# Patient Record
Sex: FEMALE | Race: WHITE | NOT HISPANIC OR LATINO | Employment: FULL TIME | URBAN - METROPOLITAN AREA
[De-identification: names, ages, dates, MRNs, and addresses within clinical notes are randomized per-mention and may not be internally consistent; named-entity substitution may affect disease eponyms.]

---

## 2018-05-22 ENCOUNTER — HISTORICAL (OUTPATIENT)
Dept: ADMINISTRATIVE | Facility: HOSPITAL | Age: 38
End: 2018-05-22

## 2018-05-24 LAB — FINAL CULTURE: NORMAL

## 2018-08-14 ENCOUNTER — HISTORICAL (OUTPATIENT)
Dept: ADMINISTRATIVE | Facility: HOSPITAL | Age: 38
End: 2018-08-14

## 2018-08-16 LAB — FINAL CULTURE: NORMAL

## 2018-12-18 ENCOUNTER — HISTORICAL (OUTPATIENT)
Dept: ADMINISTRATIVE | Facility: HOSPITAL | Age: 38
End: 2018-12-18

## 2018-12-20 LAB — FINAL CULTURE: NORMAL

## 2019-04-22 ENCOUNTER — HISTORICAL (OUTPATIENT)
Dept: ADMINISTRATIVE | Facility: HOSPITAL | Age: 39
End: 2019-04-22

## 2019-04-22 LAB
APPEARANCE, UA: CLEAR
BACTERIA #/AREA URNS AUTO: ABNORMAL /[HPF]
BILIRUB UR QL STRIP: NEGATIVE
COLOR UR: ABNORMAL
GLUCOSE (UA): NORMAL
HGB UR QL STRIP: 0.03 MG/DL
HYALINE CASTS #/AREA URNS LPF: ABNORMAL /[LPF]
KETONES UR QL STRIP: NEGATIVE
LEUKOCYTE ESTERASE UR QL STRIP: NEGATIVE
NITRITE UR QL STRIP: NEGATIVE
PH UR STRIP: 6 [PH] (ref 4.5–8)
PROT UR QL STRIP: NEGATIVE
RBC #/AREA URNS AUTO: ABNORMAL /[HPF]
SP GR UR STRIP: 1.01 (ref 1–1.03)
SQUAMOUS #/AREA URNS LPF: ABNORMAL /[LPF]
UROBILINOGEN UR STRIP-ACNC: NORMAL
WBC #/AREA URNS AUTO: ABNORMAL /HPF

## 2019-08-09 ENCOUNTER — HISTORICAL (OUTPATIENT)
Dept: WOUND CARE | Facility: HOSPITAL | Age: 39
End: 2019-08-09

## 2019-08-09 LAB
ABS NEUT (OLG): 4.62 X10(3)/MCL (ref 2.1–9.2)
BASOPHILS # BLD AUTO: 0.05 X10(3)/MCL
BASOPHILS NFR BLD AUTO: 1 %
EOSINOPHIL # BLD AUTO: 0.11 10*3/UL
EOSINOPHIL NFR BLD AUTO: 1 %
ERYTHROCYTE [DISTWIDTH] IN BLOOD BY AUTOMATED COUNT: 13.7 % (ref 11.5–14.5)
HCT VFR BLD AUTO: 37.4 % (ref 35–46)
HGB BLD-MCNC: 12.5 GM/DL (ref 12–16)
IMM GRANULOCYTES # BLD AUTO: 0.01 10*3/UL
IMM GRANULOCYTES NFR BLD AUTO: 0 %
LYMPHOCYTES # BLD AUTO: 2.49 X10(3)/MCL
LYMPHOCYTES NFR BLD AUTO: 32 % (ref 13–40)
MCH RBC QN AUTO: 31.5 PG (ref 26–34)
MCHC RBC AUTO-ENTMCNC: 33.4 GM/DL (ref 31–37)
MCV RBC AUTO: 94.2 FL (ref 80–100)
MONOCYTES # BLD AUTO: 0.58 X10(3)/MCL
MONOCYTES NFR BLD AUTO: 7 % (ref 0–24)
NEUTROPHILS # BLD AUTO: 4.62 X10(3)/MCL
NEUTROPHILS NFR BLD AUTO: 59 X10(3)/MCL
PLATELET # BLD AUTO: 335 X10(3)/MCL (ref 130–400)
PMV BLD AUTO: 9.7 FL (ref 7.4–10.4)
RBC # BLD AUTO: 3.97 X10(6)/MCL (ref 4–5.2)
WBC # SPEC AUTO: 7.9 X10(3)/MCL (ref 4.5–11)

## 2019-09-30 ENCOUNTER — HISTORICAL (OUTPATIENT)
Dept: CARDIOLOGY | Facility: HOSPITAL | Age: 39
End: 2019-09-30

## 2019-09-30 LAB
ABS NEUT (OLG): 4.47 X10(3)/MCL (ref 2.1–9.2)
BASOPHILS # BLD AUTO: 0 X10(3)/MCL (ref 0–0.2)
BASOPHILS NFR BLD AUTO: 1 %
BUN SERPL-MCNC: 20 MG/DL (ref 7–18)
CALCIUM SERPL-MCNC: 8.6 MG/DL (ref 8.5–10.1)
CHLORIDE SERPL-SCNC: 110 MMOL/L (ref 98–107)
CO2 SERPL-SCNC: 23 MMOL/L (ref 21–32)
CREAT SERPL-MCNC: 0.83 MG/DL (ref 0.55–1.02)
CREAT/UREA NIT SERPL: 24.1
EOSINOPHIL # BLD AUTO: 0.1 X10(3)/MCL (ref 0–0.9)
EOSINOPHIL NFR BLD AUTO: 2 %
ERYTHROCYTE [DISTWIDTH] IN BLOOD BY AUTOMATED COUNT: 13.4 % (ref 11.5–17)
GLUCOSE SERPL-MCNC: 82 MG/DL (ref 74–106)
HCT VFR BLD AUTO: 37.9 % (ref 37–47)
HGB BLD-MCNC: 12.6 GM/DL (ref 12–16)
INR PPP: 1.1 (ref 0–1.3)
LYMPHOCYTES # BLD AUTO: 1.9 X10(3)/MCL (ref 0.6–4.6)
LYMPHOCYTES NFR BLD AUTO: 27 %
MCH RBC QN AUTO: 31.3 PG (ref 27–31)
MCHC RBC AUTO-ENTMCNC: 33.2 GM/DL (ref 33–36)
MCV RBC AUTO: 94 FL (ref 80–94)
MONOCYTES # BLD AUTO: 0.6 X10(3)/MCL (ref 0.1–1.3)
MONOCYTES NFR BLD AUTO: 8 %
NEUTROPHILS # BLD AUTO: 4.47 X10(3)/MCL (ref 2.1–9.2)
NEUTROPHILS NFR BLD AUTO: 62 %
PLATELET # BLD AUTO: 251 X10(3)/MCL (ref 130–400)
PMV BLD AUTO: 9.4 FL (ref 9.4–12.4)
POTASSIUM SERPL-SCNC: 3.8 MMOL/L (ref 3.5–5.1)
PROTHROMBIN TIME: 13.9 SECOND(S) (ref 12–14)
RBC # BLD AUTO: 4.03 X10(6)/MCL (ref 4.2–5.4)
SODIUM SERPL-SCNC: 142 MMOL/L (ref 136–145)
WBC # SPEC AUTO: 7.2 X10(3)/MCL (ref 4.5–11.5)

## 2020-02-12 ENCOUNTER — HISTORICAL (OUTPATIENT)
Dept: ADMINISTRATIVE | Facility: HOSPITAL | Age: 40
End: 2020-02-12

## 2020-02-12 LAB
APPEARANCE, UA: CLEAR
BACTERIA #/AREA URNS AUTO: ABNORMAL /HPF
BILIRUB UR QL STRIP: NEGATIVE
COLOR UR: YELLOW
FSH SERPL-ACNC: 11.7 MIU/ML
GLUCOSE (UA): NEGATIVE
HGB UR QL STRIP: NEGATIVE
HYALINE CASTS #/AREA URNS LPF: ABNORMAL /LPF
KETONES UR QL STRIP: ABNORMAL
LEUKOCYTE ESTERASE UR QL STRIP: NEGATIVE
NITRITE UR QL STRIP: NEGATIVE
PH UR STRIP: 5.5 [PH] (ref 4.5–8)
PROT UR QL STRIP: 20 MG/DL
RBC #/AREA URNS AUTO: ABNORMAL /HPF
SP GR UR STRIP: 1.03 (ref 1–1.03)
SQUAMOUS #/AREA URNS LPF: ABNORMAL /LPF
UROBILINOGEN UR STRIP-ACNC: 2 MG/DL
WBC #/AREA URNS AUTO: ABNORMAL /HPF

## 2020-02-14 LAB — FINAL CULTURE: NO GROWTH

## 2021-03-09 ENCOUNTER — HISTORICAL (OUTPATIENT)
Dept: RADIOLOGY | Facility: HOSPITAL | Age: 41
End: 2021-03-09

## 2021-03-12 ENCOUNTER — HISTORICAL (OUTPATIENT)
Dept: RADIOLOGY | Facility: HOSPITAL | Age: 41
End: 2021-03-12

## 2021-04-15 ENCOUNTER — HISTORICAL (OUTPATIENT)
Dept: ADMINISTRATIVE | Facility: HOSPITAL | Age: 41
End: 2021-04-15

## 2022-04-10 ENCOUNTER — HISTORICAL (OUTPATIENT)
Dept: ADMINISTRATIVE | Facility: HOSPITAL | Age: 42
End: 2022-04-10

## 2022-04-27 VITALS
DIASTOLIC BLOOD PRESSURE: 81 MMHG | SYSTOLIC BLOOD PRESSURE: 119 MMHG | HEIGHT: 63 IN | WEIGHT: 186.5 LBS | BODY MASS INDEX: 33.04 KG/M2 | OXYGEN SATURATION: 98 %

## 2022-05-04 NOTE — HISTORICAL [facility] C[facility]ER
This is a historical note converted from Aaron. Formatting and pictures may have been removed.  Please reference Aaron for original formatting and attached multimedia. Chief Complaint  Annual  History of Present Illness  38 yo  presents to GYN clinic with complaint of pelvic pain:  ?  Had DepoProvera injection 10/2019  Overdue now, requesting another injection  ?  Hx of pelvic floor myalgia diagnosed last visit 2019  Complaining of pelvic pain; not constant but experiences daily; intermittently  Certain positions make it worse- crossed legged seated position; worse with intercourse; no pain at introitus??  Since last delivery- 13 months ago  All   Low bilateral pain; pain can be up to 7-8/10  Takes Aleeve for pain PRN; which does help with the pain  BM twice a week; ongoing for many years  Does have hx of migraines, not currently on medication  Also positive hx for low back pain  Patient stays at home, has not worked in 10 years  ?  Frequent UTIs; twice in the last 6 months diagnosed with PCP, s/p antibiotic course both times  Feels like she has a UTI all the time 2/2 to foul smelling urine and vaginal odor  Denies any discharge  No vaginal itching  No vaginal bleeding since Nexplanon removal 2019  ?  Hx of very heavy menstrual cycles, irregular bleeding (3 years ago, previously regular)  Hx of cervical dysplasia s/p LEEP  (done at [facility])  Unknown date of last pap smear; maybe 2 years ago at the health unit, believes it was normal  No hx of STDs; has 1 sexual partner for the last 10 years  Fm hx: cousin with ovarian cancer; aunt with breast cancer  Denies hx of tobacco, alcohol, or drug use  ?  Pregnancy History???(3,2,0,5)?? ??  Pregnancy # 1  Baby 1?????????????  Outcome Date:?  1999?????   Outcome:?Live Birth  ???  Outcome or Result:?Vaginal  ???  Gender:?  --????????  Gest Age:?  35 weeks ??????  Wt:?--  ???  Hospital:?  --????????  Tk Labor:?--  ???  Edu  Name:?  --?????  Babys Father:?--  ?  Pregnancy # 2  Baby 1?????????????  Outcome Date:?  2001?????   Outcome:?Live Birth  ???  Outcome or Result:?Vaginal  ???  Gender:?  --????????  Gest Age:?  40 weeks ??????  Wt:?--  ???  Hospital:?  --????????  Tk Labor:?--  ???  Edu Name:?  --?????  Babys Father:?--  ?  Pregnancy # 3  Baby 1?????????????  Outcome Date:?  2003?????   Outcome:?Live Birth  ???  Outcome or Result:?Vaginal  ???  Gender:?  --????????  Gest Age:?  40 weeks ??????  Wt:?--  ???  Hospital:?  --????????  Tk Labor:?--  ???  Edu Name:?  --?????  Babys Father:?--  ?  Pregnancy # 4  Baby 1?????????????  Outcome Date:?  2006?????   Outcome:?Live Birth  ???  Outcome or Result:?Vaginal  ???  Gender:?  --????????  Gest Age:?  36 weeks ??????  Wt:?--  ???  Hospital:?  --????????  Tk Labor:?--  ???  Edu Name:?  --?????  Babys Father:?--  ?  Pregnancy # 5  Baby 1?????????????  Outcome Date:?  2019?????   Outcome:?Live Birth  ???  Outcome or Result:?Vaginal  ???  Gender:?  Male????????  Gest Age:?  38 weeks 2 days ??????  Wt:??3328 g  ???  Hospital:?  --????????  Tk Labor:?--  ???  Edu Name:?  --?????  Babys Father:?--  ???  Maternal Complications:?Group B streptococcus  ???   Complications:?None  Review of Systems  Constitutional: no fever, chills, or sweats.  Gastrointestinal: no abdominal pain, no nausea, vomiting, or diarrhea, +?constipation.  Genitourinary: no dysuria, no increased urinary frequency, sometimes with incontinence with sneezing.  Musculoskeletal: + low back pain.  Integumentary: no rash or pruritus.  Otherwise as in HPI  Physical Exam  Vitals & Measurements  T:?37.2? ?C (Oral)? HR:?77(Peripheral)? RR:?18? BP:?128/84? SpO2:?98%?  HT:?160?cm? WT:?80.2?kg? BMI:?31.33?  General:?in no apparent distress.  Respiratory: unlabored breathing, symmetric chest rise.  Cardiovascular: regular rate, no peripheral edema.  Gastrointestinal:  no scars, soft.  External genitalia: no masses/lesions.?Normal appearing external anus.  Speculum exam: No masses/lesions. Cervix well visualized, smooth in contour without lesions. Os normal in appearance, no blood or discharge from os.  Bimanual exam: No cervical motion tenderness. No adnexal fullness/tenderness. No masses.  Integumentary: warm, dry, no rash.  Neurologic: alert and oriented, no focal neurologic deficits.  Assessment/Plan  1.?Myalgia of pelvic floor?M79.18  PT eval and treat; order provided to Kaity  Education handout provided for pelvic pain  Continue Aleeve PRN  Wet prep, GC/C, UA, culture to R/O infectious etiology  Pelvic US to be scheduled  Return to clinic in 2 months for follow-up  Ordered:  Chlamydia trach and N. gonorrhea PCR, Routine collect, Cervical, Order for future visit, 02/12/20 9:35:00 CST, Stop date 02/12/20 9:35:00 CST, Nurse collect, Myalgia of pelvic floor  Urinalysis with Microscopic if Indicated, Routine collect, Urine, Order for future visit, 02/12/20 9:35:00 CST, Stop date 02/12/20 9:35:00 CST, Nurse collect, Myalgia of pelvic floor  Urine Culture [id number], Routine collect, 02/12/20 9:35:00 CST, Order for future visit, Urine, Nurse collect, Stop date 02/12/20 9:35:00 CST, Myalgia of pelvic floor  US Pelvic Non-OB w Transvag if needed, Routine, *Est. 02/12/20 3:00:00 CST, Pelvic Pain, None, Patient Bed, Patient Has IV?, Rad Type, Order for future visit, Myalgia of pelvic floor, Schedule this test, [facility], *Est. 02/12/20 3:00:00 CST  Wet Prep Smear, Stat collect, Vaginal, Order for future visit, 02/12/20 9:35:00 CST, Stop date 02/12/20 9:35:00 CST, Nurse collect, Myalgia of pelvic floor, 02/12/20 9:35:00 CST  ?  2.?Amenorrhea?N91.2  FSH ordered, will need results prior to next administration of DepoProvera  Ordered:  Follicle Stimulating Hormone Level, Routine collect, *Est. 02/12/20 3:00:00 CST, Blood, Order for future visit, *Est. Stop date 02/12/20  3:00:00 CST, Lab Collect, Amenorrhea, 02/12/20 9:33:00 CST  ?  3.?History of cervical dysplasia?Z87.410  Pap collected  Ordered:  Pathology Gyn Request, *Est. 02/12/20 3:00:00 CST, AP Specimen, Thin Prep Pap Cervical-Auto/man screen, Screening fo CX Neoplasm, Cervical, Thin Prep with HPV Probe, Normal, 04/12/19, Previous Pap, unknown, Abnormal Pap, Depo Provera, Previous Pregnancy, Cervix Present, Rou...  ?   Problem List/Past Medical History  Ongoing  Anxiety  Anxiety  Bipolar  Depression  History of pre-eclampsia  Insomnia  Obesity  Rh negative  Tobacco user  Tobacco user  Historical  Group B streptococcus  Gum disease  Hypermenorrhoea  Pregnant  Pregnant  Pregnant  Pregnant  Pregnant  Procedure/Surgical History  Dilation of Left Common Iliac Vein with Intraluminal Device, Percutaneous Approach (09/30/2019)  Dilation of Right Common Iliac Vein with Intraluminal Device, Percutaneous Approach (09/30/2019)  Fluoroscopy of Bilateral Lower Extremity Veins using Low Osmolar Contrast (09/30/2019)  Injection procedure for extremity venography (including introduction of needle or intracatheter) (09/30/2019)  Intravascular ultrasound (noncoronary vessel) during diagnostic evaluation and/or therapeutic intervention, including radiological supervision and interpretation; each additional noncoronary vessel (List separately in addition to code for primary procedur (09/30/2019)  Intravascular ultrasound (noncoronary vessel) during diagnostic evaluation and/or therapeutic intervention, including radiological supervision and interpretation; initial noncoronary vessel (List separately in addition to code for primary procedure) (09/30/2019)  Transcatheter placement of an intravascular stent(s), open or percutaneous, including radiological supervision and interpretation and including angioplasty within the same vessel, when performed; each additional vein (List separately in addition to code f (09/30/2019)  Transcatheter placement of  an intravascular stent(s), open or percutaneous, including radiological supervision and interpretation and including angioplasty within the same vessel, when performed; initial vein (09/30/2019)  Ultrasonography of Bilateral Lower Extremity Veins, Intravascular (09/30/2019)  Venogram: Bilateral Common Iliac Stents (09/30/2019)  Delivery of Products of Conception, External Approach (01/04/2019)  Right Hip, Spider Bite Debridement  Teeth Removal, Top   Medications  clonazePAM 0.5 mg oral tablet, 0.5 mg= 1 tab(s), Oral, TID  Depo-Provera 150 mg/mL intramuscular suspension, 150 mg= 1 mL, IM, Once  Depo-Provera 50 mg/mL intramuscular suspension, 50 mg, IM, q3mo  Seroquel 200 mg oral tablet, 200 mg= 1 tab(s), Oral, BID  Allergies  Demerol?(Itching)  Morphine Sulfate?(Itching)  Social History  Abuse/Neglect  No, 09/30/2019  Alcohol - Denies Alcohol Use, 10/29/2012  Past, 09/30/2019  Employment/School  Unemployed, Highest education level: High school., 04/03/2017  Exercise  Exercise frequency: 1-2 times/week. Exercise type: Walking., 02/12/2020  Home/Environment  Lives with Children, Father., 02/12/2020  Nutrition/Health  Regular, 02/12/2020  Sexual  Sexually active: Yes. Number of current partners 1. Sexual orientation: Straight or heterosexual. Gender Identity Identifies as female., 02/12/2020  Substance Use - Denies Substance Abuse, 07/27/2014  Never, 08/31/2017  Tobacco - High Risk, 07/27/2015  10 or more cigarettes (1/2 pack or more)/day in last 30 days, No, 02/12/2020  Family History  Hypertension.: Mother.  Thyroid disease.: Mother.  Father: History is negative      Reviewed the patients medical history, residents findings on physical exam, and the diagnosis with treatment plan. Care provided was reasonable and necessary.?  I was personally present on this date of service and discussed the patients care on this actual date.   I personally examined her, reviewed previous notes where Dr. Mcbride examined her.? Pt  agrees to STI testing.  PE: abdomen protuberant, striae, limited core strength. Carnatt sign pos bilateral lower quadrants  ?  External genitalia: Normal female anatomy, no masses/lesions. Normal appearing urethral meatus. Normal appearing external anus. No lymphadenopathy.  Pelvic Floor:? levator ani muscles:??vita ttp??? Obturator m:?R>L exquisitely ttp *Reproduces pain? ? Piriformis m.:??not examined due to patient intolerance?  Bimanual exam: Vaginal with?good? capacity. Uterus? 8cm in size, +cervical motion tenderness.? mobile. No adnexal fullness/tenderness. Urethra ttp**?and Bladder nontender.  Speculum exam: vaginal mucosa normal in appearance. Pink. No masses/lesions. Cervix well visualized, smooth in contour no masses or lesions. Os normal in appearance, no blood or discharge coming from the os.

## 2022-05-04 NOTE — HISTORICAL [facility] CERNER
This is a historical note converted from Aaron. Formatting and pictures may have been removed.  Please reference Aaron for original formatting and attached multimedia. Chief Complaint  left knee pain  History of Present Illness  40 Years??Female?non-smoker?presents to?[facility]?for?initial visit?for?left?knee pain.? Patient points to ?medial knee.  ?   Patient states that she fell about 6 weeks ago after her leg gave out on her while going upstairs. Fell directly on top of her knee, had immediate pain and swelling. Went to the PCP a couple days later due to the swelling. Had CT scan done. Was given Norco and Ibuprofen. Pain is overall better, continues with intermittent pain.  ?  Current pain level: 6/10 (rated as?moderate) ?without medication?. 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  Review of Systems  Constitutional: no fever, no chills, no weight loss  CV: no swelling, no edema  : no urinary retention, no urinary incontinence  GI: no fecal incontinence  Skin: no rash, no wound  Neuro: no numbness/tingling, no weakness, no saddle anesthesia  MSK: as above  Psych: no depression, no anxiety  Heme/Lymph: no easy bruising, no easy bleeding, no lymphadenopathy  Immuno: no allergic reaction, no recurrent infections  Physical Exam  Vitals & Measurements  HR:?85(Peripheral)? BP:?119/81?  HT:?160.00?cm? WT:?84.600?kg? BMI:?33.05?  General: well developed; well nourished;  cooperative  PSYCH: alert and oriented with?appropriate mood and affect  SKIN: inspection and palpation of skin and soft tissue normal; no scars noted on upper/lower extremities  CV: vascular integrity noted; +2 symmetrical pulses, no edema  NEURO: sensation intact by light touch  ?   MSK:?left knee  Inspection:?limping;??full weight bearing;??normal?alignment;??no swelling;?no?erythema;?brusing over anterior knee noted;?no atrophy or deconditioning noted  Palpation:??Tender over?patella and?surrounding soft?tissue; ?Crepitus:??Negative  ROM:?  Active Extension/Flexion (0-140):?15-90  Passive?Extension/Flexion (0-140):?  Strength:? Flexion??5/5, Extension??5/5  Special Tests:  Ballotable Effusion: ?Negative; Fluid Wave:??Negative  Anterior Drawer:?Negative;? Lachman:?Negative;?  Posterior Sag Sign:??Negative;? Posterior Drawer:?Negative;  Varus Stress:?LCL stable at 0 and 30 degrees  Valgus Stress:?MCL?stable at 0 and 30 degrees  Patellar Grind: ?Negative  Paula:?Negative?;  Neurovascular:?Intact; 2+?distal pulse, sensation intact to light touch  ?   MSK:?right knee  Inspection:?Normal gait/station;??full weight bearing;??normal?alignment;??no swelling;?no?erythema;?no?bruising;?no atrophy or deconditioning noted  Palpation:??non-tender; ?Crepitus:??Negative  ROM:?  Active Extension/Flexion (0-140):?0-140  Passive?Extension/Flexion (0-140):?0-140  Strength:? Flexion??5/5, Extension??5/5  Special Tests:  Ballotable Effusion: ?Negative; Fluid Wave:??Negative  Anterior Drawer:?Negative;? Lachman:?Negative;?  Posterior Sag Sign:??Negative;? Posterior Drawer:?Negative;  Varus Stress:?LCL stable at 0 and 30 degrees  Valgus Stress:?MCL?stable at 0 and 30 degrees  Patellar Grind: ?Negative  Paula:?Negative?;  Neurovascular:?Intact; 2+?distal pulse, sensation intact to light touch  Assessment/Plan  1.?Contusion of left knee?S80.02XA  Patient with fall onto knee about 6 weeks ago. Had significant pain and  swelling. Had CT of left knee done showing soft tissue edema and suspected prepatellar hematoma. Swelling improving, but continues with pain. Will treat conservatively with PT/HEP,?Meloxicam, and bracing.  Imaging: Xray ordered and interpreted by me, discussed with patient. Awaiting official read.?  Stabilization/Immobilization:?Knee sleeve provided, DME script for Hinged knee brace given  Activity:?Activity as tolerated  Therapy:?formal PT/OT?3x a week for 12 weeks  Medication:?Meloxicam 15mg; medication precautions given  RTC:?2 months?for re-evaluation,?if improved?does not?need to?return  Imaging:?none  Additional work-up:?none  ?  2.?Obesity?E66.9  ?-Lifestyle modification advised  Discussed with the fellow at time of visit? Patient chart reviewed. Patient seen and evaluated at time of visit.?HPI, PE, and Assessment and Plan reviewed. Treatment plan is reasonable and appropriate.? All questions were answered.??Compliance with treatment plan is appropriate.  ?Radiology images independently reviewed and agree with radiologist. ?Radiology images independently reviewed and agree with fellow.?-Consuelo Connelly, DO CAQSM   Medications  clonazePAM 0.5 mg oral tablet, 0.5 mg= 1 tab(s), Oral, TID  Depo-Provera 150 mg/mL intramuscular suspension, 150 mg= 1 mL, IM, Once  Depo-Provera 50 mg/mL intramuscular suspension, 50 mg, IM, q3mo  Ortho Micronor 0.35 mg oral tablet, 0.35 mg= 1 tab(s), Oral, Daily, 3 refills  Seroquel 200 mg oral tablet, 200 mg= 1 tab(s), Oral, BID  Diagnostic Results  Xray left knee 4/15: On my read, no obvious fracture or dislocation noted. Mild medial joint space narrowing. Awaiting official read.

## 2022-10-13 ENCOUNTER — HOSPITAL ENCOUNTER (EMERGENCY)
Facility: HOSPITAL | Age: 42
Discharge: HOME | End: 2022-10-13
Attending: EMERGENCY MEDICINE
Payer: MEDICAID

## 2022-10-13 VITALS
TEMPERATURE: 98 F | BODY MASS INDEX: 31.18 KG/M2 | SYSTOLIC BLOOD PRESSURE: 118 MMHG | DIASTOLIC BLOOD PRESSURE: 81 MMHG | RESPIRATION RATE: 18 BRPM | WEIGHT: 176 LBS | HEART RATE: 89 BPM | OXYGEN SATURATION: 100 % | HEIGHT: 63 IN

## 2022-10-13 DIAGNOSIS — K08.89 TOOTHACHE: ICD-10-CM

## 2022-10-13 DIAGNOSIS — K08.89 PAIN, DENTAL: Primary | ICD-10-CM

## 2022-10-13 PROCEDURE — 99284 EMERGENCY DEPT VISIT MOD MDM: CPT

## 2022-10-13 RX ORDER — AMOXICILLIN 500 MG/1
500 CAPSULE ORAL 3 TIMES DAILY
Qty: 30 CAPSULE | Refills: 0 | Status: SHIPPED | OUTPATIENT
Start: 2022-10-13 | End: 2022-10-23

## 2022-10-13 RX ORDER — IBUPROFEN 800 MG/1
800 TABLET ORAL EVERY 6 HOURS PRN
Qty: 20 TABLET | Refills: 0 | Status: SHIPPED | OUTPATIENT
Start: 2022-10-13

## 2022-10-13 NOTE — ED PROVIDER NOTES
Encounter Date: 10/13/2022       History     Chief Complaint   Patient presents with    Dental Pain     Possible tooth infection lower right     Patient presents to ER today with a complaint of left lower toothache.  This is been ongoing for the past week.  Patient has an appointment with her dentist in 2 weeks to get her bottom teeth pulled to get dentures.  Patient has been taking Motrin with little relief.    Review of patient's allergies indicates:  No Known Allergies  No past medical history on file.  No past surgical history on file.  No family history on file.     Review of Systems   HENT:          Left lower toothache   All other systems reviewed and are negative.    Physical Exam     Initial Vitals [10/13/22 1148]   BP Pulse Resp Temp SpO2   118/81 89 18 98.4 °F (36.9 °C) 100 %      MAP       --         Physical Exam    Nursing note and vitals reviewed.  Constitutional: She appears well-developed and well-nourished.   HENT:   Head: Normocephalic and atraumatic.   Right Ear: External ear normal.   Left Ear: External ear normal.   Nose: Nose normal.   Mouth/Throat: Oropharynx is clear and moist.   Multiple missing teeth lower jaw.  Dental caries noted.  Right canine severe dental decay noted.  Gum slightly erythematous.  No swelling or drainage.   Eyes: Conjunctivae and EOM are normal. Pupils are equal, round, and reactive to light.   Neck: Neck supple.   Normal range of motion.  Cardiovascular:  Normal rate, regular rhythm and intact distal pulses.           Pulmonary/Chest: Breath sounds normal.   Musculoskeletal:         General: Normal range of motion.      Cervical back: Normal range of motion and neck supple.     Neurological: She is alert and oriented to person, place, and time. She has normal strength.   Skin: Skin is warm and dry.   Psychiatric: She has a normal mood and affect. Her behavior is normal. Judgment and thought content normal.       ED Course   Procedures  Labs Reviewed - No data to  display       Imaging Results    None          Medications - No data to display  Medical Decision Making:   Differential Diagnosis:   Dental caries, toothache, dental abscess  ED Management:  Patient to take the amoxicillin 3 times a day.  Keep her appointment with her dentist.  Alternate Tylenol and Motrin every 4 hours to help with pain.  Return to the ER for worsening symptoms or condition.  Patient verbalized understanding and agrees to treatment plan.                        Clinical Impression:   Final diagnoses:  [K08.89] Pain, dental (Primary)  [K08.89] Toothache        ED Disposition Condition    Discharge Stable          ED Prescriptions       Medication Sig Dispense Start Date End Date Auth. Provider    amoxicillin (AMOXIL) 500 MG capsule Take 1 capsule (500 mg total) by mouth 3 (three) times daily. for 10 days 30 capsule 10/13/2022 10/23/2022 DARNELL Tejada    ibuprofen (ADVIL,MOTRIN) 800 MG tablet Take 1 tablet (800 mg total) by mouth every 6 (six) hours as needed for Pain. 20 tablet 10/13/2022 -- DARNELL Tejada          Follow-up Information    None          DARNELL Tejada  10/13/22 2273

## 2022-10-13 NOTE — DISCHARGE INSTRUCTIONS
Take amoxil 3 x a day. Follow up with your dentist next week. Return to er for worsening of symptoms

## 2024-11-14 DIAGNOSIS — Z12.31 ENCOUNTER FOR SCREENING MAMMOGRAM FOR MALIGNANT NEOPLASM OF BREAST: Primary | ICD-10-CM

## 2025-02-13 DIAGNOSIS — Z12.31 ENCOUNTER FOR SCREENING MAMMOGRAM FOR MALIGNANT NEOPLASM OF BREAST: Primary | ICD-10-CM
